# Patient Record
Sex: MALE | Race: OTHER | NOT HISPANIC OR LATINO
[De-identification: names, ages, dates, MRNs, and addresses within clinical notes are randomized per-mention and may not be internally consistent; named-entity substitution may affect disease eponyms.]

---

## 2018-07-01 ENCOUNTER — TRANSCRIPTION ENCOUNTER (OUTPATIENT)
Age: 23
End: 2018-07-01

## 2018-08-04 ENCOUNTER — APPOINTMENT (OUTPATIENT)
Dept: FAMILY MEDICINE | Facility: CLINIC | Age: 23
End: 2018-08-04

## 2018-08-04 PROBLEM — Z00.00 ENCOUNTER FOR PREVENTIVE HEALTH EXAMINATION: Status: ACTIVE | Noted: 2018-08-04

## 2019-02-05 ENCOUNTER — APPOINTMENT (OUTPATIENT)
Dept: INTERNAL MEDICINE | Facility: CLINIC | Age: 24
End: 2019-02-05

## 2019-06-30 ENCOUNTER — EMERGENCY (EMERGENCY)
Facility: HOSPITAL | Age: 24
LOS: 1 days | Discharge: HOME | End: 2019-06-30
Attending: EMERGENCY MEDICINE
Payer: COMMERCIAL

## 2019-06-30 VITALS
SYSTOLIC BLOOD PRESSURE: 11 MMHG | WEIGHT: 169.98 LBS | HEIGHT: 76 IN | OXYGEN SATURATION: 99 % | DIASTOLIC BLOOD PRESSURE: 62 MMHG | RESPIRATION RATE: 20 BRPM | TEMPERATURE: 98 F | HEART RATE: 76 BPM

## 2019-06-30 PROCEDURE — 99283 EMERGENCY DEPT VISIT LOW MDM: CPT

## 2019-06-30 PROCEDURE — 99282 EMERGENCY DEPT VISIT SF MDM: CPT

## 2019-06-30 NOTE — ED PROVIDER NOTE - MUSCULOSKELETAL NEGATIVE STATEMENT, MLM
Med called in  
Please call in 1 refill of Tramadol.  
no back pain, no gout, no musculoskeletal pain, no neck pain, and no weakness.
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
## 2019-06-30 NOTE — ED PROVIDER NOTE - CLINICAL SUMMARY MEDICAL DECISION MAKING FREE TEXT BOX
neuro: CN II - XII intact, EOMI, PERRL, no papilledema, 5/5 muscle strength x 4 extremities, no sensory deficits, 2+ dtr globally, negative babinski, no ataxic gait, normal ISAIAH and FNT, normal romberg   return to ed for intractable HA, persistent vomiting, or new onset motor/sensory deficits   has ent f/u for proabbly clincall nasal bone fx

## 2019-06-30 NOTE — ED PROVIDER NOTE - NEUROLOGICAL, MLM
Neuro: CN II-XII grossly in tact. Symmetrical smile. PERRL. EOMI. Bilateral and symmetric sensation of face. Tongue midline. Normal finger to nose. Normal heel to shin. Normal rapid alternating movements. No pronator drift. Normal gait without assistance. Sensation symmetrically intact bilateral upper and lower extremities. Reflexes 2+ bilaterally. Babinski negative bilaterally.
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
## 2019-08-01 ENCOUNTER — APPOINTMENT (OUTPATIENT)
Dept: PHYSICAL MEDICINE AND REHAB | Facility: CLINIC | Age: 24
End: 2019-08-01
Payer: COMMERCIAL

## 2019-08-01 DIAGNOSIS — R42 DIZZINESS AND GIDDINESS: ICD-10-CM

## 2019-08-01 DIAGNOSIS — S09.90XA UNSPECIFIED INJURY OF HEAD, INITIAL ENCOUNTER: ICD-10-CM

## 2019-08-01 DIAGNOSIS — H53.9 UNSPECIFIED VISUAL DISTURBANCE: ICD-10-CM

## 2019-08-01 DIAGNOSIS — R51 HEADACHE: ICD-10-CM

## 2019-08-01 DIAGNOSIS — R11.0 NAUSEA: ICD-10-CM

## 2019-08-01 PROCEDURE — 99204 OFFICE O/P NEW MOD 45 MIN: CPT | Mod: GC

## 2019-08-01 RX ORDER — IBUPROFEN 200 MG/1
TABLET, COATED ORAL
Refills: 0 | Status: ACTIVE | COMMUNITY
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
## 2019-08-01 NOTE — SOCIAL HISTORY
[Mother] : mother [Father] : father [Private Home] : in a private home [No Device Needed] : Patient doesn't use a device for ambulation [FreeTextEntry6] : Patient states that he drinks "a lot" of alcohol. He usually drinks a few beers after work, sometimes wine at dinner. Patient states that the alcohol has helped calm him down and makes his pain and dizziness improve.\par Patient has been also using CBD to help him with his anxiety and headache.

## 2019-08-01 NOTE — PHYSICAL EXAM
[Normal] : Oriented to person, place, and time, insight and judgement were intact and the affect was normal [de-identified] : mild saccades left eye; +++saccades, no nystagmus, ++symptoms with testing, balance intact with static, dynamic and head turns - mild symptoms

## 2019-08-01 NOTE — REVIEW OF SYSTEMS
[Vision Problems] : vision problems [Headache] : headache [Dizziness] : dizziness [Anxiety] : anxiety [Negative] : Heme/Lymph [FreeTextEntry3] : Blurry vision

## 2019-10-16 ENCOUNTER — APPOINTMENT (OUTPATIENT)
Dept: PHYSICAL MEDICINE AND REHAB | Facility: CLINIC | Age: 24
End: 2019-10-16
Payer: COMMERCIAL

## 2019-10-16 VITALS
HEIGHT: 76 IN | WEIGHT: 175 LBS | DIASTOLIC BLOOD PRESSURE: 70 MMHG | SYSTOLIC BLOOD PRESSURE: 123 MMHG | BODY MASS INDEX: 21.31 KG/M2

## 2019-10-16 DIAGNOSIS — F19.20 OTHER PSYCHOACTIVE SUBSTANCE DEPENDENCE, UNCOMPLICATED: ICD-10-CM

## 2019-10-16 DIAGNOSIS — R41.9 UNSPECIFIED SYMPTOMS AND SIGNS INVOLVING COGNITIVE FUNCTIONS AND AWARENESS: ICD-10-CM

## 2019-10-16 DIAGNOSIS — G47.9 SLEEP DISORDER, UNSPECIFIED: ICD-10-CM

## 2019-10-16 DIAGNOSIS — F32.9 MAJOR DEPRESSIVE DISORDER, SINGLE EPISODE, UNSPECIFIED: ICD-10-CM

## 2019-10-16 PROCEDURE — 99214 OFFICE O/P EST MOD 30 MIN: CPT
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
## 2019-10-16 NOTE — PHYSICAL EXAM
[Normal] : Normal gait, no clubbing or cyanosis of the fingernails, no joint swelling seen and muscle strength and tone normal [de-identified] : pain with AROM in PVM [de-identified] : KYARA GAIT [de-identified] : Anxious

## 2019-10-16 NOTE — REVIEW OF SYSTEMS
[Patient Intake Form Reviewed] : Patient intake form was reviewed [Fatigue] : fatigue [Joint Stiffness] : joint stiffness [Muscle Pain] : muscle pain [Headache] : headache [Insomnia] : insomnia [Anxiety] : anxiety [Depression] : depression [Negative] : Heme/Lymph [Suicidal] : not suicidal

## 2020-06-02 ENCOUNTER — APPOINTMENT (OUTPATIENT)
Dept: PHYSICAL MEDICINE AND REHAB | Facility: CLINIC | Age: 25
End: 2020-06-02
Payer: COMMERCIAL

## 2020-06-02 VITALS
TEMPERATURE: 97.9 F | BODY MASS INDEX: 21.31 KG/M2 | WEIGHT: 175 LBS | HEIGHT: 76 IN | DIASTOLIC BLOOD PRESSURE: 78 MMHG | SYSTOLIC BLOOD PRESSURE: 128 MMHG

## 2020-06-02 DIAGNOSIS — S09.90XD UNSPECIFIED INJURY OF HEAD, SUBSEQUENT ENCOUNTER: ICD-10-CM

## 2020-06-02 DIAGNOSIS — G44.329 CHRONIC POST-TRAUMATIC HEADACHE, NOT INTRACTABLE: ICD-10-CM

## 2020-06-02 DIAGNOSIS — R29.898 OTHER SYMPTOMS AND SIGNS INVOLVING THE MUSCULOSKELETAL SYSTEM: ICD-10-CM

## 2020-06-02 DIAGNOSIS — F41.9 ANXIETY DISORDER, UNSPECIFIED: ICD-10-CM

## 2020-06-02 DIAGNOSIS — M54.2 CERVICALGIA: ICD-10-CM

## 2020-06-02 PROCEDURE — 99214 OFFICE O/P EST MOD 30 MIN: CPT

## 2020-06-02 RX ORDER — DEXTROAMPHETAMINE SACCHARATE, AMPHETAMINE ASPARTATE, DEXTROAMPHETAMINE SULFATE, AND AMPHETAMINE SULFATE 3.75; 3.75; 3.75; 3.75 MG/1; MG/1; MG/1; MG/1
TABLET ORAL
Refills: 0 | Status: ACTIVE | COMMUNITY

## 2020-06-02 NOTE — REVIEW OF SYSTEMS
[Patient Intake Form Reviewed] : Patient intake form was reviewed [Joint Pain] : joint pain [Joint Stiffness] : joint stiffness [Muscle Pain] : muscle pain [Headache] : headache [Anxiety] : anxiety [Depression] : depression [Negative] : Psychiatric
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
## 2020-06-02 NOTE — PHYSICAL EXAM
[de-identified] : pain with AROM, pain on palpation to the left trapezius [Normal] : Normal gait, no clubbing or cyanosis of the fingernails, no joint swelling seen and muscle strength and tone normal [de-identified] : KYARA GAIT [de-identified] : Anxious

## 2025-02-19 NOTE — ED PROVIDER NOTE - EYES NEGATIVE STATEMENT, MLM
Advance Care Planning   Healthcare Decision Maker:    Primary Decision Maker: [email or web address] - Child - [phone]    Secondary Decision Maker: Patti Ryan Parent - [phone]    Click here to complete Healthcare Decision Makers including selection of the Healthcare Decision Maker Relationship (ie \"Primary\").            
no discharge, no irritation, no pain, no redness, and no visual changes.